# Patient Record
Sex: FEMALE | Race: WHITE | NOT HISPANIC OR LATINO
[De-identification: names, ages, dates, MRNs, and addresses within clinical notes are randomized per-mention and may not be internally consistent; named-entity substitution may affect disease eponyms.]

---

## 2019-02-10 ENCOUNTER — TRANSCRIPTION ENCOUNTER (OUTPATIENT)
Age: 59
End: 2019-02-10

## 2019-02-11 ENCOUNTER — INPATIENT (INPATIENT)
Facility: HOSPITAL | Age: 59
LOS: 0 days | Discharge: HOME | DRG: 343 | End: 2019-02-12
Attending: SURGERY | Admitting: SURGERY
Payer: COMMERCIAL

## 2019-02-11 ENCOUNTER — TRANSCRIPTION ENCOUNTER (OUTPATIENT)
Age: 59
End: 2019-02-11

## 2019-02-11 ENCOUNTER — RESULT REVIEW (OUTPATIENT)
Age: 59
End: 2019-02-11

## 2019-02-11 VITALS
HEIGHT: 62 IN | HEART RATE: 64 BPM | SYSTOLIC BLOOD PRESSURE: 135 MMHG | OXYGEN SATURATION: 100 % | WEIGHT: 139.99 LBS | TEMPERATURE: 98 F | DIASTOLIC BLOOD PRESSURE: 69 MMHG | RESPIRATION RATE: 18 BRPM

## 2019-02-11 DIAGNOSIS — K36 OTHER APPENDICITIS: ICD-10-CM

## 2019-02-11 DIAGNOSIS — Z98.891 HISTORY OF UTERINE SCAR FROM PREVIOUS SURGERY: Chronic | ICD-10-CM

## 2019-02-11 DIAGNOSIS — K35.80 UNSPECIFIED ACUTE APPENDICITIS: ICD-10-CM

## 2019-02-11 DIAGNOSIS — Z90.710 ACQUIRED ABSENCE OF BOTH CERVIX AND UTERUS: Chronic | ICD-10-CM

## 2019-02-11 LAB
ALBUMIN SERPL ELPH-MCNC: 3.9 G/DL — SIGNIFICANT CHANGE UP (ref 3.3–5)
ALP SERPL-CCNC: 50 U/L — SIGNIFICANT CHANGE UP (ref 40–120)
ALT FLD-CCNC: 35 U/L DA — SIGNIFICANT CHANGE UP (ref 10–45)
ANION GAP SERPL CALC-SCNC: 12 MMOL/L — SIGNIFICANT CHANGE UP (ref 5–17)
APTT BLD: 29.6 SEC — SIGNIFICANT CHANGE UP (ref 27.5–36.3)
AST SERPL-CCNC: 29 U/L — SIGNIFICANT CHANGE UP (ref 10–40)
BASOPHILS # BLD AUTO: 0.1 K/UL — SIGNIFICANT CHANGE UP (ref 0–0.2)
BASOPHILS NFR BLD AUTO: 0.5 % — SIGNIFICANT CHANGE UP (ref 0–2)
BILIRUB SERPL-MCNC: 0.6 MG/DL — SIGNIFICANT CHANGE UP (ref 0.2–1.2)
BLD GP AB SCN SERPL QL: SIGNIFICANT CHANGE UP
BUN SERPL-MCNC: 20 MG/DL — SIGNIFICANT CHANGE UP (ref 7–23)
CALCIUM SERPL-MCNC: 9.2 MG/DL — SIGNIFICANT CHANGE UP (ref 8.4–10.5)
CHLORIDE SERPL-SCNC: 106 MMOL/L — SIGNIFICANT CHANGE UP (ref 96–108)
CO2 SERPL-SCNC: 22 MMOL/L — SIGNIFICANT CHANGE UP (ref 22–31)
CREAT SERPL-MCNC: 0.84 MG/DL — SIGNIFICANT CHANGE UP (ref 0.5–1.3)
EOSINOPHIL # BLD AUTO: 0.1 K/UL — SIGNIFICANT CHANGE UP (ref 0–0.5)
EOSINOPHIL NFR BLD AUTO: 0.5 % — SIGNIFICANT CHANGE UP (ref 0–6)
GLUCOSE SERPL-MCNC: 108 MG/DL — HIGH (ref 70–99)
HCT VFR BLD CALC: 40.1 % — SIGNIFICANT CHANGE UP (ref 34.5–45)
HGB BLD-MCNC: 13.7 G/DL — SIGNIFICANT CHANGE UP (ref 11.5–15.5)
INR BLD: 0.97 RATIO — SIGNIFICANT CHANGE UP (ref 0.88–1.16)
LACTATE SERPL-SCNC: 2.1 MMOL/L — HIGH (ref 0.7–2)
LIDOCAIN IGE QN: 138 U/L — SIGNIFICANT CHANGE UP (ref 73–393)
LYMPHOCYTES # BLD AUTO: 1.8 K/UL — SIGNIFICANT CHANGE UP (ref 1–3.3)
LYMPHOCYTES # BLD AUTO: 15.2 % — SIGNIFICANT CHANGE UP (ref 13–44)
MCHC RBC-ENTMCNC: 30.3 PG — SIGNIFICANT CHANGE UP (ref 27–34)
MCHC RBC-ENTMCNC: 34.2 GM/DL — SIGNIFICANT CHANGE UP (ref 32–36)
MCV RBC AUTO: 88.6 FL — SIGNIFICANT CHANGE UP (ref 80–100)
MONOCYTES # BLD AUTO: 0.5 K/UL — SIGNIFICANT CHANGE UP (ref 0–0.9)
MONOCYTES NFR BLD AUTO: 4.5 % — SIGNIFICANT CHANGE UP (ref 1–9)
NEUTROPHILS # BLD AUTO: 9.2 K/UL — HIGH (ref 1.8–7.4)
NEUTROPHILS NFR BLD AUTO: 79.4 % — HIGH (ref 43–77)
PLATELET # BLD AUTO: 168 K/UL — SIGNIFICANT CHANGE UP (ref 150–400)
POTASSIUM SERPL-MCNC: 3.8 MMOL/L — SIGNIFICANT CHANGE UP (ref 3.5–5.3)
POTASSIUM SERPL-SCNC: 3.8 MMOL/L — SIGNIFICANT CHANGE UP (ref 3.5–5.3)
PROT SERPL-MCNC: 7.1 G/DL — SIGNIFICANT CHANGE UP (ref 6–8.3)
PROTHROM AB SERPL-ACNC: 10.8 SEC — SIGNIFICANT CHANGE UP (ref 10–12.9)
RBC # BLD: 4.53 M/UL — SIGNIFICANT CHANGE UP (ref 3.8–5.2)
RBC # FLD: 12.7 % — SIGNIFICANT CHANGE UP (ref 10.3–14.5)
SODIUM SERPL-SCNC: 140 MMOL/L — SIGNIFICANT CHANGE UP (ref 135–145)
TROPONIN I SERPL-MCNC: <.017 NG/ML — LOW (ref 0.02–0.06)
WBC # BLD: 11.6 K/UL — HIGH (ref 3.8–10.5)
WBC # FLD AUTO: 11.6 K/UL — HIGH (ref 3.8–10.5)

## 2019-02-11 PROCEDURE — 74177 CT ABD & PELVIS W/CONTRAST: CPT | Mod: 26

## 2019-02-11 PROCEDURE — 71045 X-RAY EXAM CHEST 1 VIEW: CPT | Mod: 26

## 2019-02-11 PROCEDURE — 99285 EMERGENCY DEPT VISIT HI MDM: CPT | Mod: 25

## 2019-02-11 PROCEDURE — 44970 LAPAROSCOPY APPENDECTOMY: CPT

## 2019-02-11 PROCEDURE — 88304 TISSUE EXAM BY PATHOLOGIST: CPT | Mod: 26

## 2019-02-11 RX ORDER — MORPHINE SULFATE 50 MG/1
4 CAPSULE, EXTENDED RELEASE ORAL ONCE
Qty: 0 | Refills: 0 | Status: DISCONTINUED | OUTPATIENT
Start: 2019-02-11 | End: 2019-02-11

## 2019-02-11 RX ORDER — OXYCODONE AND ACETAMINOPHEN 5; 325 MG/1; MG/1
1 TABLET ORAL EVERY 4 HOURS
Qty: 0 | Refills: 0 | Status: DISCONTINUED | OUTPATIENT
Start: 2019-02-11 | End: 2019-02-12

## 2019-02-11 RX ORDER — HYDROMORPHONE HYDROCHLORIDE 2 MG/ML
0.5 INJECTION INTRAMUSCULAR; INTRAVENOUS; SUBCUTANEOUS
Qty: 0 | Refills: 0 | Status: DISCONTINUED | OUTPATIENT
Start: 2019-02-11 | End: 2019-02-11

## 2019-02-11 RX ORDER — ONDANSETRON 8 MG/1
4 TABLET, FILM COATED ORAL ONCE
Qty: 0 | Refills: 0 | Status: DISCONTINUED | OUTPATIENT
Start: 2019-02-11 | End: 2019-02-11

## 2019-02-11 RX ORDER — SODIUM CHLORIDE 9 MG/ML
3 INJECTION INTRAMUSCULAR; INTRAVENOUS; SUBCUTANEOUS ONCE
Qty: 0 | Refills: 0 | Status: COMPLETED | OUTPATIENT
Start: 2019-02-11 | End: 2019-02-11

## 2019-02-11 RX ORDER — GALCANEZUMAB 100 MG/ML
0 INJECTION, SOLUTION SUBCUTANEOUS
Qty: 0 | Refills: 0 | COMMUNITY

## 2019-02-11 RX ORDER — SODIUM CHLORIDE 9 MG/ML
1000 INJECTION INTRAMUSCULAR; INTRAVENOUS; SUBCUTANEOUS ONCE
Qty: 0 | Refills: 0 | Status: COMPLETED | OUTPATIENT
Start: 2019-02-11 | End: 2019-02-11

## 2019-02-11 RX ORDER — MORPHINE SULFATE 50 MG/1
2 CAPSULE, EXTENDED RELEASE ORAL EVERY 4 HOURS
Qty: 0 | Refills: 0 | Status: DISCONTINUED | OUTPATIENT
Start: 2019-02-11 | End: 2019-02-12

## 2019-02-11 RX ORDER — ACETAMINOPHEN 500 MG
1000 TABLET ORAL ONCE
Qty: 0 | Refills: 0 | Status: COMPLETED | OUTPATIENT
Start: 2019-02-11 | End: 2019-02-11

## 2019-02-11 RX ORDER — DOCUSATE SODIUM 100 MG
100 CAPSULE ORAL THREE TIMES A DAY
Qty: 0 | Refills: 0 | Status: DISCONTINUED | OUTPATIENT
Start: 2019-02-11 | End: 2019-02-12

## 2019-02-11 RX ORDER — ONDANSETRON 8 MG/1
4 TABLET, FILM COATED ORAL ONCE
Qty: 0 | Refills: 0 | Status: COMPLETED | OUTPATIENT
Start: 2019-02-11 | End: 2019-02-11

## 2019-02-11 RX ORDER — PIPERACILLIN AND TAZOBACTAM 4; .5 G/20ML; G/20ML
3.38 INJECTION, POWDER, LYOPHILIZED, FOR SOLUTION INTRAVENOUS ONCE
Qty: 0 | Refills: 0 | Status: COMPLETED | OUTPATIENT
Start: 2019-02-11 | End: 2019-02-11

## 2019-02-11 RX ORDER — PIPERACILLIN AND TAZOBACTAM 4; .5 G/20ML; G/20ML
3.38 INJECTION, POWDER, LYOPHILIZED, FOR SOLUTION INTRAVENOUS EVERY 8 HOURS
Qty: 0 | Refills: 0 | Status: DISCONTINUED | OUTPATIENT
Start: 2019-02-11 | End: 2019-02-12

## 2019-02-11 RX ORDER — MORPHINE SULFATE 50 MG/1
2 CAPSULE, EXTENDED RELEASE ORAL ONCE
Qty: 0 | Refills: 0 | Status: DISCONTINUED | OUTPATIENT
Start: 2019-02-11 | End: 2019-02-11

## 2019-02-11 RX ORDER — ACETAMINOPHEN 500 MG
650 TABLET ORAL EVERY 6 HOURS
Qty: 0 | Refills: 0 | Status: DISCONTINUED | OUTPATIENT
Start: 2019-02-11 | End: 2019-02-12

## 2019-02-11 RX ORDER — SODIUM CHLORIDE 9 MG/ML
1000 INJECTION, SOLUTION INTRAVENOUS
Qty: 0 | Refills: 0 | Status: DISCONTINUED | OUTPATIENT
Start: 2019-02-11 | End: 2019-02-12

## 2019-02-11 RX ORDER — MORPHINE SULFATE 50 MG/1
1 CAPSULE, EXTENDED RELEASE ORAL ONCE
Qty: 0 | Refills: 0 | Status: DISCONTINUED | OUTPATIENT
Start: 2019-02-11 | End: 2019-02-11

## 2019-02-11 RX ORDER — SODIUM CHLORIDE 9 MG/ML
1000 INJECTION, SOLUTION INTRAVENOUS
Qty: 0 | Refills: 0 | Status: DISCONTINUED | OUTPATIENT
Start: 2019-02-11 | End: 2019-02-11

## 2019-02-11 RX ADMIN — MORPHINE SULFATE 2 MILLIGRAM(S): 50 CAPSULE, EXTENDED RELEASE ORAL at 19:41

## 2019-02-11 RX ADMIN — MORPHINE SULFATE 4 MILLIGRAM(S): 50 CAPSULE, EXTENDED RELEASE ORAL at 07:32

## 2019-02-11 RX ADMIN — SODIUM CHLORIDE 90 MILLILITER(S): 9 INJECTION, SOLUTION INTRAVENOUS at 22:03

## 2019-02-11 RX ADMIN — SODIUM CHLORIDE 1000 MILLILITER(S): 9 INJECTION INTRAMUSCULAR; INTRAVENOUS; SUBCUTANEOUS at 08:30

## 2019-02-11 RX ADMIN — ONDANSETRON 4 MILLIGRAM(S): 8 TABLET, FILM COATED ORAL at 07:30

## 2019-02-11 RX ADMIN — SODIUM CHLORIDE 1000 MILLILITER(S): 9 INJECTION INTRAMUSCULAR; INTRAVENOUS; SUBCUTANEOUS at 07:28

## 2019-02-11 RX ADMIN — HYDROMORPHONE HYDROCHLORIDE 0.5 MILLIGRAM(S): 2 INJECTION INTRAMUSCULAR; INTRAVENOUS; SUBCUTANEOUS at 17:14

## 2019-02-11 RX ADMIN — HYDROMORPHONE HYDROCHLORIDE 0.5 MILLIGRAM(S): 2 INJECTION INTRAMUSCULAR; INTRAVENOUS; SUBCUTANEOUS at 17:26

## 2019-02-11 RX ADMIN — MORPHINE SULFATE 4 MILLIGRAM(S): 50 CAPSULE, EXTENDED RELEASE ORAL at 13:10

## 2019-02-11 RX ADMIN — PIPERACILLIN AND TAZOBACTAM 25 GRAM(S): 4; .5 INJECTION, POWDER, LYOPHILIZED, FOR SOLUTION INTRAVENOUS at 19:10

## 2019-02-11 RX ADMIN — MORPHINE SULFATE 4 MILLIGRAM(S): 50 CAPSULE, EXTENDED RELEASE ORAL at 07:45

## 2019-02-11 RX ADMIN — Medication 400 MILLIGRAM(S): at 22:56

## 2019-02-11 RX ADMIN — MORPHINE SULFATE 4 MILLIGRAM(S): 50 CAPSULE, EXTENDED RELEASE ORAL at 12:56

## 2019-02-11 RX ADMIN — SODIUM CHLORIDE 3 MILLILITER(S): 9 INJECTION INTRAMUSCULAR; INTRAVENOUS; SUBCUTANEOUS at 07:26

## 2019-02-11 RX ADMIN — Medication 1000 MILLIGRAM(S): at 23:20

## 2019-02-11 RX ADMIN — PIPERACILLIN AND TAZOBACTAM 200 GRAM(S): 4; .5 INJECTION, POWDER, LYOPHILIZED, FOR SOLUTION INTRAVENOUS at 11:53

## 2019-02-11 RX ADMIN — MORPHINE SULFATE 2 MILLIGRAM(S): 50 CAPSULE, EXTENDED RELEASE ORAL at 10:03

## 2019-02-11 RX ADMIN — MORPHINE SULFATE 2 MILLIGRAM(S): 50 CAPSULE, EXTENDED RELEASE ORAL at 20:00

## 2019-02-11 RX ADMIN — MORPHINE SULFATE 2 MILLIGRAM(S): 50 CAPSULE, EXTENDED RELEASE ORAL at 10:15

## 2019-02-11 RX ADMIN — Medication 100 MILLIGRAM(S): at 21:59

## 2019-02-11 NOTE — DISCHARGE NOTE ADULT - NS AS ACTIVITY OBS
No Heavy lifting/straining/Do not make important decisions/Do not drive or operate machinery/Walking-Outdoors allowed/Bathing allowed/Showering allowed

## 2019-02-11 NOTE — H&P ADULT - ASSESSMENT
acute appendicitis  plan:  iv fluids  IV zosyn    OR booked at 1045 am for add on today as soon as room opens up

## 2019-02-11 NOTE — H&P ADULT - HISTORY OF PRESENT ILLNESS
57 yo female who had periumbilical pain yesterday now localized to right lower quadrant. associated with anorexia.  received morphine in ER earluier with some relief now pain has returned. past surgical history significant for hysterectomy.  no known allergies

## 2019-02-11 NOTE — ED PROVIDER NOTE - PHYSICAL EXAMINATION
General:     NAD, well-nourished, well-appearing  Head:     NC/AT, EOMI, oral mucosa moist  Neck:     trachea midline  Lungs:     CTA b/l, no w/r/r  CVS:     S1S2, RRR, no m/g/r  Abd:     +BS, s distended moderate , generalized tenderness, no organomegaly  Ext:    2+ radial and pedal pulses, no c/c/e  Neuro: AAOx3, no sensory/motor deficits

## 2019-02-11 NOTE — PROGRESS NOTE ADULT - PROBLEM SELECTOR PLAN 1
gave patient one dose of ofirmev. pain improved to 2/10. goal pain control 2-3/10 post operatively. avoid opioids if possible. patient now resting comfortably in bed.

## 2019-02-11 NOTE — DISCHARGE NOTE ADULT - HOSPITAL COURSE
pt presented to ER today and admitted for acute appendicits. Pt brought to OR urgently and underwent laparascopic appendectomy by dr gordon on 2/11/19.

## 2019-02-11 NOTE — ED ADULT NURSE NOTE - ED STAT RN HANDOFF DETAILS
Spoke to Rosa Maria in the OR, stated no assigned surgery time, Okay to bring patient to bed, will go from Surgical ICU to OR.

## 2019-02-11 NOTE — DISCHARGE NOTE ADULT - MEDICATION SUMMARY - MEDICATIONS TO TAKE
I will START or STAY ON the medications listed below when I get home from the hospital:    Emgality-gnlm 120 mg/mL subcutaneous solution  -- every month  -- Indication: For H    Percocet 5/325 oral tablet  -- 1 tab(s) by mouth every 8 hours, As Needed -for severe pain MDD:3   -- Caution federal law prohibits the transfer of this drug to any person other  than the person for whom it was prescribed.  May cause drowsiness.  Alcohol may intensify this effect.  Use care when operating dangerous machinery.  This prescription cannot be refilled.  This product contains acetaminophen.  Do not use  with any other product containing acetaminophen to prevent possible liver damage.  Using more of this medication than prescribed may cause serious breathing problems.    -- Indication: For post op pain I will START or STAY ON the medications listed below when I get home from the hospital:    Emgality-gnlm 120 mg/mL subcutaneous solution  -- every month  -- Indication: For Migraine    Percocet 5/325 oral tablet  -- 1 tab(s) by mouth every 8 hours, As Needed -for severe pain MDD:3   -- Caution federal law prohibits the transfer of this drug to any person other  than the person for whom it was prescribed.  May cause drowsiness.  Alcohol may intensify this effect.  Use care when operating dangerous machinery.  This prescription cannot be refilled.  This product contains acetaminophen.  Do not use  with any other product containing acetaminophen to prevent possible liver damage.  Using more of this medication than prescribed may cause serious breathing problems.    -- Indication: For post op pain    acetaminophen 325 mg oral tablet  -- 2 tab(s) by mouth every 6 hours, As needed, Temp greater or equal to 38.5C (101.3F), Mild Pain (1 - 3)  -- Indication: For pain

## 2019-02-11 NOTE — DISCHARGE NOTE ADULT - CARE PLAN
Principal Discharge DX:	Acute appendicitis  Goal:	improve quality  Assessment and plan of treatment:	s/p lap appendectomy

## 2019-02-11 NOTE — DISCHARGE NOTE ADULT - PATIENT PORTAL LINK FT
You can access the [facility] Patient Portal, offered by [facility], by registering with the following website: [email or web address]

## 2019-02-11 NOTE — PROGRESS NOTE ADULT - SUBJECTIVE AND OBJECTIVE BOX
Patient is a 58y old  Female who presents with a chief complaint of generalized abdominal pain (2019 16:28)      BRIEF HOSPITAL COURSE: 57 y/o female with no significant past medical hx who presented with 10/10 RLQ abdominal pain now s/p laparoscopic appendectomy (nonpeforated). Called to patients bedside to assess abdominal pain that has not improved after 2 mg morphine. Patient states pain has improved since the morphine but is still a 5/10 and disrupting sleep. Denies n/v/d. Hemodynamically stable.    Events last 24 hours: as above    PAST MEDICAL & SURGICAL HISTORY:  Age related osteoporosis, unspecified pathological fracture presence  Migraine  H/O abdominal hysterectomy  History of       Review of Systems:  CONSTITUTIONAL: No fever, chills, or fatigue  EYES: No eye pain, visual disturbances, or discharge  ENMT:  No difficulty hearing, tinnitus, vertigo; No sinus or throat pain  NECK: No pain or stiffness  RESPIRATORY: No cough, wheezing, chills or hemoptysis; No shortness of breath  CARDIOVASCULAR: No chest pain, palpitations, dizziness, or leg swelling  GASTROINTESTINAL: +abdominal and RLQ pain. No nausea, vomiting, or hematemesis; No diarrhea or constipation. No melena or hematochezia.  GENITOURINARY: No dysuria, frequency, hematuria, or incontinence  NEUROLOGICAL: No headaches, memory loss, loss of strength, numbness, or tremors  SKIN: No itching, burning, rashes, or lesions   MUSCULOSKELETAL: No joint pain or swelling; No muscle, back, or extremity pain  PSYCHIATRIC: No depression, anxiety, mood swings, or difficulty sleeping      Medications:  piperacillin/tazobactam IVPB. 3.375 Gram(s) IV Intermittent every 8 hours        acetaminophen   Tablet .. 650 milliGRAM(s) Oral every 6 hours PRN  morphine  - Injectable 2 milliGRAM(s) IV Push every 4 hours PRN  oxyCODONE    5 mG/acetaminophen 325 mG 1 Tablet(s) Oral every 4 hours PRN        docusate sodium 100 milliGRAM(s) Oral three times a day        lactated ringers. 1000 milliLiter(s) IV Continuous <Continuous>                ICU Vital Signs Last 24 Hrs  T(C): 36.3 (2019 21:04), Max: 36.6 (2019 06:53)  T(F): 97.4 (2019 21:04), Max: 97.9 (2019 06:53)  HR: 55 (2019 21:04) (48 - 65)  BP: 127/77 (2019 21:04) (107/66 - 144/69)  BP(mean): --  ABP: --  ABP(mean): --  RR: 15 (2019 21:04) (14 - 18)  SpO2: 97% (2019 21:04) (96% - 100%)          I&O's Detail    2019 07:01  -  2019 04:06  --------------------------------------------------------  IN:    lactated ringers.: 125 mL    Oral Fluid: 100 mL  Total IN: 225 mL    OUT:  Total OUT: 0 mL    Total NET: 225 mL            LABS:                        13.7   11.6  )-----------( 168      ( 2019 07:25 )             40.1     02-11    140  |  106  |  20  ----------------------------<  108<H>  3.8   |  22  |  0.84    Ca    9.2      2019 07:25    TPro  7.1  /  Alb  3.9  /  TBili  0.6  /  DBili  x   /  AST  29  /  ALT  35  /  AlkPhos  50  02-11      CARDIAC MARKERS ( 2019 07:25 )  <.017 ng/mL / x     / x     / x     / x          CAPILLARY BLOOD GLUCOSE        PT/INR - ( 2019 07:25 )   PT: 10.8 sec;   INR: 0.97 ratio         PTT - ( 2019 07:25 )  PTT:29.6 sec    CULTURES:      Physical Examination:    General: No acute distress.      HEENT: Pupils equal, reactive to light.  Symmetric.    PULM: Clear to auscultation bilaterally, no significant sputum production    NECK: Supple, no lymphadenopathy, trachea midline    CVS: Regular rate and rhythm, no murmurs, rubs, or gallops    ABD: TTP in RLQ over surgical sites. Soft, nondistended, normoactive bowel sounds, no masses    EXT: No edema, nontender    SKIN: Warm and well perfused, no rashes noted.    NEURO: Alert, oriented, interactive, nonfocal    DEVICES: none    RADIOLOGY:   EXAM:  CT ABDOMEN AND PELVIS OC IC      PROCEDURE DATE:  2019        INTERPRETATION:  CT abdomen and pelvis with contrast  Comparison 16  History abdominal pain    Contrast Omnipaque 90 cc; 10 cc discarded    There are small low-attenuation lesions in the liver demonstrating   lobular peripheral enhancement most typical of hemangiomas. The largest   measures 2 cm in long axis dimension. The spleen and gallbladder,   pancreas and adrenals and kidneys are within normal limits.     Theappendix is intermediate in caliber at 8 mm in overall dimension   with mild luminal dilatation measuring 5 mm. This is increased since the   prior exam. There is no appendicolith. There is trace surrounding fat   stranding associated with the neck of the appendix without abscess, free   air or obstruction. The appendix is partially retrocecal.     There is no ascites or adenopathy or focal inflammation of the   peritoneal fat. The urinary bladder, uterus and adnexa are within normal   limits.    IMPRESSION:  Findings most suspicious for early acute appendicitis                  MONICA ROWLAND M.D., ATTENDING RADIOLOGIST  This document has been electronically signed. 2019  9:57AM

## 2019-02-11 NOTE — H&P ADULT - ATTENDING COMMENTS
I explained procedure to patient,  and daughter incuding pros and cons and possible conversion to open.  I answered all their questions.

## 2019-02-11 NOTE — H&P ADULT - NSHPLABSRESULTS_GEN_ALL_CORE
Complete Blood Count + Automated Diff (02.11.19 @ 07:25)    WBC Count: 11.6 K/uL    RBC Count: 4.53 M/uL    Hemoglobin: 13.7 g/dL    Hematocrit: 40.1 %    Mean Cell Volume: 88.6 fl    Mean Cell Hemoglobin: 30.3 pg    Mean Cell Hemoglobin Conc: 34.2 gm/dL    Red Cell Distrib Width: 12.7 %    Platelet Count - Automated: 168 K/uL    Auto Neutrophil #: 9.2 K/uL    Auto Lymphocyte #: 1.8 K/uL    Auto Monocyte #: 0.5 K/uL    Auto Eosinophil #: 0.1 K/uL    Auto Basophil #: 0.1 K/uL    Auto Neutrophil %: 79.4: Differential percentages must be correlated with absolute numbers for  clinical significance. %    Auto Lymphocyte %: 15.2 %    Auto Monocyte %: 4.5 %    Auto Eosinophil %: 0.5 %    Auto Basophil %: 0.5 %      Comprehensive Metabolic Panel (02.11.19 @ 07:25)    Sodium, Serum: 140 mmol/L    Potassium, Serum: 3.8 mmol/L    Chloride, Serum: 106 mmol/L    Carbon Dioxide, Serum: 22 mmol/L    Anion Gap, Serum: 12 mmol/L    Blood Urea Nitrogen, Serum: 20 mg/dL    Creatinine, Serum: 0.84 mg/dL    Glucose, Serum: 108 mg/dL    Calcium, Total Serum: 9.2 mg/dL    Protein Total, Serum: 7.1 g/dL    Albumin, Serum: 3.9 g/dL    Bilirubin Total, Serum: 0.6 mg/dL    Alkaline Phosphatase, Serum: 50 U/L    Aspartate Aminotransferase (AST/SGOT): 29 U/L    Alanine Aminotransferase (ALT/SGPT): 35 U/L DA            ct scan(I personally reviewed images)  appendicitis-no evidence of perforation

## 2019-02-11 NOTE — BRIEF OPERATIVE NOTE - PROCEDURE
<<-----Click on this checkbox to enter Procedure Appendectomy, laparoscopic  02/11/2019    Active  [id number]

## 2019-02-11 NOTE — ED PROVIDER NOTE - CHPI ED SYMPTOMS NEG
no blood in stool/no diarrhea/no dysuria/no hematuria/no vomiting/no fever/no chills/no burning urination

## 2019-02-11 NOTE — ED PROVIDER NOTE - NS ED ROS FT
Constitutional: (-) fever  (-)chills  (-)sweats  Eyes/ENT: (-) blurry vision, (-) epistaxis  (-)rhinorrhea   (-) sore throat    Cardiovascular: (-) chest pain, (-) palpitations (-) edema   Respiratory: (-) cough, (-) shortness of breath   Gastrointestinal: (+)nausea  (-)vomiting, (-) diarrhea  (+) abdominal pain   :  (-)dysuria, (-)frequency, (-)urgency, (-)hematuria  Musculoskeletal: (-) neck pain, (-) back pain, (-) joint pain  Integumentary: (-) rash, (-) edema  Neurological: (-) headache, (-) altered mental status  (-)LOC

## 2019-02-11 NOTE — H&P ADULT - NSHPPHYSICALEXAM_GEN_ALL_CORE
in ER bay  vital stable, mild discomfort  Heent-sclera anicteric    abdomen-lower right paramedian scar(s/p hysterectomy), non distended  3-4+/4+ rlq tenderness over McNurney's point    extrem-neg

## 2019-02-11 NOTE — DISCHARGE NOTE ADULT - CARE PROVIDER_API CALL
Lele Pacheco)  Surgery  [address] [place] [address]  Phone: [phone]  Fax: [phone]  Follow Up Time:

## 2019-02-12 VITALS
OXYGEN SATURATION: 100 % | RESPIRATION RATE: 15 BRPM | HEART RATE: 56 BPM | TEMPERATURE: 98 F | SYSTOLIC BLOOD PRESSURE: 106 MMHG | DIASTOLIC BLOOD PRESSURE: 68 MMHG | WEIGHT: 154.1 LBS

## 2019-02-12 DIAGNOSIS — K35.80 UNSPECIFIED ACUTE APPENDICITIS: ICD-10-CM

## 2019-02-12 LAB
ANION GAP SERPL CALC-SCNC: 6 MMOL/L — SIGNIFICANT CHANGE UP (ref 5–17)
BASOPHILS # BLD AUTO: 0 K/UL — SIGNIFICANT CHANGE UP (ref 0–0.2)
BASOPHILS NFR BLD AUTO: 0.4 % — SIGNIFICANT CHANGE UP (ref 0–2)
BUN SERPL-MCNC: 10 MG/DL — SIGNIFICANT CHANGE UP (ref 7–23)
CALCIUM SERPL-MCNC: 8.4 MG/DL — SIGNIFICANT CHANGE UP (ref 8.4–10.5)
CHLORIDE SERPL-SCNC: 108 MMOL/L — SIGNIFICANT CHANGE UP (ref 96–108)
CO2 SERPL-SCNC: 26 MMOL/L — SIGNIFICANT CHANGE UP (ref 22–31)
CREAT SERPL-MCNC: 0.87 MG/DL — SIGNIFICANT CHANGE UP (ref 0.5–1.3)
EOSINOPHIL # BLD AUTO: 0 K/UL — SIGNIFICANT CHANGE UP (ref 0–0.5)
EOSINOPHIL NFR BLD AUTO: 0.1 % — SIGNIFICANT CHANGE UP (ref 0–6)
GLUCOSE SERPL-MCNC: 105 MG/DL — HIGH (ref 70–99)
HCT VFR BLD CALC: 37.2 % — SIGNIFICANT CHANGE UP (ref 34.5–45)
HGB BLD-MCNC: 12.3 G/DL — SIGNIFICANT CHANGE UP (ref 11.5–15.5)
LYMPHOCYTES # BLD AUTO: 1.3 K/UL — SIGNIFICANT CHANGE UP (ref 1–3.3)
LYMPHOCYTES # BLD AUTO: 12.9 % — LOW (ref 13–44)
MCHC RBC-ENTMCNC: 29.8 PG — SIGNIFICANT CHANGE UP (ref 27–34)
MCHC RBC-ENTMCNC: 33.1 GM/DL — SIGNIFICANT CHANGE UP (ref 32–36)
MCV RBC AUTO: 90.1 FL — SIGNIFICANT CHANGE UP (ref 80–100)
MONOCYTES # BLD AUTO: 0.7 K/UL — SIGNIFICANT CHANGE UP (ref 0–0.9)
MONOCYTES NFR BLD AUTO: 6.6 % — SIGNIFICANT CHANGE UP (ref 1–9)
NEUTROPHILS # BLD AUTO: 8 K/UL — HIGH (ref 1.8–7.4)
NEUTROPHILS NFR BLD AUTO: 80 % — HIGH (ref 43–77)
PLATELET # BLD AUTO: 165 K/UL — SIGNIFICANT CHANGE UP (ref 150–400)
POTASSIUM SERPL-MCNC: 4.5 MMOL/L — SIGNIFICANT CHANGE UP (ref 3.5–5.3)
POTASSIUM SERPL-SCNC: 4.5 MMOL/L — SIGNIFICANT CHANGE UP (ref 3.5–5.3)
RBC # BLD: 4.13 M/UL — SIGNIFICANT CHANGE UP (ref 3.8–5.2)
RBC # FLD: 13.2 % — SIGNIFICANT CHANGE UP (ref 10.3–14.5)
SODIUM SERPL-SCNC: 140 MMOL/L — SIGNIFICANT CHANGE UP (ref 135–145)
WBC # BLD: 10 K/UL — SIGNIFICANT CHANGE UP (ref 3.8–10.5)
WBC # FLD AUTO: 10 K/UL — SIGNIFICANT CHANGE UP (ref 3.8–10.5)

## 2019-02-12 PROCEDURE — 71045 X-RAY EXAM CHEST 1 VIEW: CPT

## 2019-02-12 PROCEDURE — 85027 COMPLETE CBC AUTOMATED: CPT

## 2019-02-12 PROCEDURE — 80048 BASIC METABOLIC PNL TOTAL CA: CPT

## 2019-02-12 PROCEDURE — 96376 TX/PRO/DX INJ SAME DRUG ADON: CPT

## 2019-02-12 PROCEDURE — 86850 RBC ANTIBODY SCREEN: CPT

## 2019-02-12 PROCEDURE — 88304 TISSUE EXAM BY PATHOLOGIST: CPT

## 2019-02-12 PROCEDURE — 84484 ASSAY OF TROPONIN QUANT: CPT

## 2019-02-12 PROCEDURE — 85730 THROMBOPLASTIN TIME PARTIAL: CPT

## 2019-02-12 PROCEDURE — 80053 COMPREHEN METABOLIC PANEL: CPT

## 2019-02-12 PROCEDURE — 74177 CT ABD & PELVIS W/CONTRAST: CPT

## 2019-02-12 PROCEDURE — 96375 TX/PRO/DX INJ NEW DRUG ADDON: CPT

## 2019-02-12 PROCEDURE — 96374 THER/PROPH/DIAG INJ IV PUSH: CPT | Mod: XU

## 2019-02-12 PROCEDURE — 86803 HEPATITIS C AB TEST: CPT

## 2019-02-12 PROCEDURE — 36415 COLL VENOUS BLD VENIPUNCTURE: CPT

## 2019-02-12 PROCEDURE — 83690 ASSAY OF LIPASE: CPT

## 2019-02-12 PROCEDURE — 99285 EMERGENCY DEPT VISIT HI MDM: CPT | Mod: 25

## 2019-02-12 PROCEDURE — 85610 PROTHROMBIN TIME: CPT

## 2019-02-12 PROCEDURE — 86900 BLOOD TYPING SEROLOGIC ABO: CPT

## 2019-02-12 PROCEDURE — 83605 ASSAY OF LACTIC ACID: CPT

## 2019-02-12 PROCEDURE — 86901 BLOOD TYPING SEROLOGIC RH(D): CPT

## 2019-02-12 RX ORDER — ACETAMINOPHEN 500 MG
2 TABLET ORAL
Qty: 0 | Refills: 0 | COMMUNITY
Start: 2019-02-12

## 2019-02-12 RX ADMIN — Medication 100 MILLIGRAM(S): at 05:43

## 2019-02-12 RX ADMIN — Medication 650 MILLIGRAM(S): at 03:43

## 2019-02-12 RX ADMIN — PIPERACILLIN AND TAZOBACTAM 25 GRAM(S): 4; .5 INJECTION, POWDER, LYOPHILIZED, FOR SOLUTION INTRAVENOUS at 02:47

## 2019-02-12 RX ADMIN — Medication 650 MILLIGRAM(S): at 02:43

## 2019-02-12 NOTE — PROGRESS NOTE ADULT - ASSESSMENT
a/p    doing well  reasured about shoulder paint(C02 will resorb)    d/c with pain meds  f/u next week

## 2019-02-12 NOTE — PROGRESS NOTE ADULT - SUBJECTIVE AND OBJECTIVE BOX
pod#1    a and o x 3  c/o right shoulder pain    vitals-stable    abdomen-soft and non distended and non tender  no trocar site hernias      labs:Complete Blood Count + Automated Diff (02.12.19 @ 07:50)    WBC Count: 10.0 K/uL    RBC Count: 4.13 M/uL    Hemoglobin: 12.3 g/dL    Hematocrit: 37.2 %    Mean Cell Volume: 90.1 fl    Mean Cell Hemoglobin: 29.8 pg    Mean Cell Hemoglobin Conc: 33.1 gm/dL    Red Cell Distrib Width: 13.2 %    Platelet Count - Automated: 165 K/uL    Auto Neutrophil #: 8.0 K/uL    Auto Lymphocyte #: 1.3 K/uL    Auto Monocyte #: 0.7 K/uL    Auto Eosinophil #: 0.0 K/uL    Auto Basophil #: 0.0 K/uL    Auto Neutrophil %: 80.0: Differential percentages must be correlated with absolute numbers for  clinical significance. %    Auto Lymphocyte %: 12.9 %    Auto Monocyte %: 6.6 %    Auto Eosinophil %: 0.1 %    Auto Basophil %: 0.4 %

## 2019-02-13 PROBLEM — G43.909 MIGRAINE, UNSPECIFIED, NOT INTRACTABLE, WITHOUT STATUS MIGRAINOSUS: Chronic | Status: ACTIVE | Noted: 2019-02-11

## 2019-02-13 PROBLEM — M81.0 AGE-RELATED OSTEOPOROSIS WITHOUT CURRENT PATHOLOGICAL FRACTURE: Chronic | Status: ACTIVE | Noted: 2019-02-11

## 2019-02-13 NOTE — CHART NOTE - NSCHARTNOTEFT_GEN_A_CORE
addendum note in response to medical record query:    please note patient was discharged 2/12/2019 not 2/11/2019.

## 2019-02-14 ENCOUNTER — APPOINTMENT (OUTPATIENT)
Dept: OTOLARYNGOLOGY | Facility: CLINIC | Age: 59
End: 2019-02-14
Payer: COMMERCIAL

## 2019-02-14 VITALS
OXYGEN SATURATION: 98 % | BODY MASS INDEX: 26.5 KG/M2 | RESPIRATION RATE: 16 BRPM | WEIGHT: 144 LBS | HEIGHT: 62 IN | SYSTOLIC BLOOD PRESSURE: 122 MMHG | DIASTOLIC BLOOD PRESSURE: 70 MMHG | HEART RATE: 64 BPM

## 2019-02-14 DIAGNOSIS — K13.0 DISEASES OF LIPS: ICD-10-CM

## 2019-02-14 LAB — SURGICAL PATHOLOGY STUDY: SIGNIFICANT CHANGE UP

## 2019-02-14 PROCEDURE — 99243 OFF/OP CNSLTJ NEW/EST LOW 30: CPT

## 2019-02-15 PROBLEM — K13.0 LIP ULCER: Status: ACTIVE | Noted: 2019-02-15

## 2019-02-15 NOTE — ASSESSMENT
[FreeTextEntry1] : Ulcer on right upper lip is c/w a pressure ulcer from the endotracheal tube.  No intervention is required at this time.  Pt will f/u if ulcer does not heal within the next 10 days.

## 2019-02-15 NOTE — CONSULT LETTER
[Dear  ___] : Dear  [unfilled], [Consult Letter:] : I had the pleasure of evaluating your patient, [unfilled]. [Please see my note below.] : Please see my note below. [Consult Closing:] : Thank you very much for allowing me to participate in the care of this patient.  If you have any questions, please do not hesitate to contact me. [Sincerely,] : Sincerely, [FreeTextEntry2] : Lele Pacheco MD [FreeTextEntry3] : Chandan George MD, FACS\par Clinical \par Dept. of Otolaryngology\par [facility]\par  [DrAlissa  ___] : Dr. DOOLEY

## 2019-02-15 NOTE — HISTORY OF PRESENT ILLNESS
[de-identified] : 57 y/o F with a h/o emergency appendectomy 3 days ago.  Over the past day she developed a painful sore on the right upper lip mucosa.

## 2019-02-15 NOTE — REASON FOR VISIT
[Initial Consultation] : an initial consultation for [FreeTextEntry2] : R upper lip sore after emergency APPY.

## 2019-02-15 NOTE — PHYSICAL EXAM
[Midline] : trachea located in midline position [de-identified] : Pressure ulcer on right upper lip.  [Normal] : no rashes

## 2019-02-18 ENCOUNTER — TRANSCRIPTION ENCOUNTER (OUTPATIENT)
Age: 59
End: 2019-02-18

## 2019-02-26 ENCOUNTER — APPOINTMENT (OUTPATIENT)
Dept: SURGERY | Facility: CLINIC | Age: 59
End: 2019-02-26
Payer: COMMERCIAL

## 2019-02-26 VITALS
HEIGHT: 62 IN | HEART RATE: 62 BPM | OXYGEN SATURATION: 97 % | DIASTOLIC BLOOD PRESSURE: 70 MMHG | SYSTOLIC BLOOD PRESSURE: 108 MMHG | RESPIRATION RATE: 16 BRPM | BODY MASS INDEX: 26.5 KG/M2 | WEIGHT: 144 LBS | TEMPERATURE: 98.3 F

## 2019-02-26 DIAGNOSIS — K35.890 OTHER ACUTE APPENDICITIS WITHOUT PERFORATION OR GANGRENE: ICD-10-CM

## 2019-02-26 PROCEDURE — 99024 POSTOP FOLLOW-UP VISIT: CPT

## 2019-05-08 ENCOUNTER — APPOINTMENT (OUTPATIENT)
Dept: OBGYN | Facility: CLINIC | Age: 59
End: 2019-05-08
Payer: COMMERCIAL

## 2019-05-08 VITALS
BODY MASS INDEX: 26.5 KG/M2 | WEIGHT: 144 LBS | HEIGHT: 62 IN | DIASTOLIC BLOOD PRESSURE: 72 MMHG | HEART RATE: 78 BPM | OXYGEN SATURATION: 98 % | SYSTOLIC BLOOD PRESSURE: 116 MMHG

## 2019-05-08 DIAGNOSIS — R92.2 INCONCLUSIVE MAMMOGRAM: ICD-10-CM

## 2019-05-08 DIAGNOSIS — R10.2 PELVIC AND PERINEAL PAIN: ICD-10-CM

## 2019-05-08 DIAGNOSIS — Z12.31 ENCOUNTER FOR SCREENING MAMMOGRAM FOR MALIGNANT NEOPLASM OF BREAST: ICD-10-CM

## 2019-05-08 DIAGNOSIS — R14.0 ABDOMINAL DISTENSION (GASEOUS): ICD-10-CM

## 2019-05-08 PROCEDURE — 99203 OFFICE O/P NEW LOW 30 MIN: CPT

## 2019-05-08 RX ORDER — GALCANEZUMAB 120 MG/ML
120 INJECTION, SOLUTION SUBCUTANEOUS
Qty: 1 | Refills: 0 | Status: ACTIVE | COMMUNITY
Start: 2019-04-04

## 2019-05-08 RX ORDER — DENOSUMAB 60 MG/ML
60 INJECTION SUBCUTANEOUS
Qty: 1 | Refills: 0 | Status: ACTIVE | COMMUNITY
Start: 2019-05-06

## 2019-05-08 NOTE — HISTORY OF PRESENT ILLNESS
[Good] : being in good health [Last Mammogram ___] : Last Mammogram was [unfilled] [Last Bone Density ___] : Last bone density studies [unfilled] [Last Colonoscopy ___] : Last colonoscopy [unfilled] [Last Pap ___] : Last cervical pap smear was [unfilled] [Postmenopausal] : is postmenopausal

## 2019-05-08 NOTE — PHYSICAL EXAM
[Awake] : awake [Alert] : alert [Acute Distress] : no acute distress [Mass] : no breast mass [Nipple Discharge] : no nipple discharge [Soft] : soft [Axillary LAD] : no axillary lymphadenopathy [Tender] : non tender [Oriented x3] : oriented to person, place, and time [Normal] : cervix [No Bleeding] : there was no active vaginal bleeding [Absent] : absent [Adnexa Absent] : was absent on the right [Uterine Adnexae] : was normal on the left

## 2019-05-08 NOTE — REVIEW OF SYSTEMS
[Feeling Tired] : feeling tired [Abdominal Pain] : abdominal pain [Anxiety] : anxiety [Nl] : Endocrine

## 2019-05-08 NOTE — CHIEF COMPLAINT
[Initial Visit] : initial GYN visit [FreeTextEntry1] : JASON,RSO age 41 for fibroids and bleeding\par pap 2017,mammo 2017 colon 5 yrs ago dexa 2019\par c/o bloating and abd pain--continuous --nothing specific aggravates/alleviates

## 2019-05-09 LAB — HPV HIGH+LOW RISK DNA PNL CVX: NOT DETECTED

## 2019-05-12 LAB — CYTOLOGY CVX/VAG DOC THIN PREP: NORMAL

## 2019-05-21 ENCOUNTER — FORM ENCOUNTER (OUTPATIENT)
Age: 59
End: 2019-05-21

## 2019-05-22 ENCOUNTER — APPOINTMENT (OUTPATIENT)
Dept: ULTRASOUND IMAGING | Facility: CLINIC | Age: 59
End: 2019-05-22
Payer: COMMERCIAL

## 2019-05-22 ENCOUNTER — APPOINTMENT (OUTPATIENT)
Dept: MAMMOGRAPHY | Facility: CLINIC | Age: 59
End: 2019-05-22
Payer: COMMERCIAL

## 2019-05-22 ENCOUNTER — OUTPATIENT (OUTPATIENT)
Dept: OUTPATIENT SERVICES | Facility: HOSPITAL | Age: 59
LOS: 1 days | End: 2019-05-22
Payer: COMMERCIAL

## 2019-05-22 DIAGNOSIS — Z98.891 HISTORY OF UTERINE SCAR FROM PREVIOUS SURGERY: Chronic | ICD-10-CM

## 2019-05-22 DIAGNOSIS — Z12.31 ENCOUNTER FOR SCREENING MAMMOGRAM FOR MALIGNANT NEOPLASM OF BREAST: ICD-10-CM

## 2019-05-22 DIAGNOSIS — R92.2 INCONCLUSIVE MAMMOGRAM: ICD-10-CM

## 2019-05-22 DIAGNOSIS — Z90.710 ACQUIRED ABSENCE OF BOTH CERVIX AND UTERUS: Chronic | ICD-10-CM

## 2019-05-22 PROCEDURE — 76830 TRANSVAGINAL US NON-OB: CPT | Mod: 26

## 2019-05-22 PROCEDURE — 76641 ULTRASOUND BREAST COMPLETE: CPT

## 2019-05-22 PROCEDURE — 77063 BREAST TOMOSYNTHESIS BI: CPT | Mod: 26

## 2019-05-22 PROCEDURE — 76830 TRANSVAGINAL US NON-OB: CPT

## 2019-05-22 PROCEDURE — 76641 ULTRASOUND BREAST COMPLETE: CPT | Mod: 26,50

## 2019-05-22 PROCEDURE — 77067 SCR MAMMO BI INCL CAD: CPT | Mod: 26

## 2019-05-22 PROCEDURE — 77067 SCR MAMMO BI INCL CAD: CPT

## 2019-05-22 PROCEDURE — 76857 US EXAM PELVIC LIMITED: CPT | Mod: 26,59

## 2019-05-22 PROCEDURE — 76856 US EXAM PELVIC COMPLETE: CPT

## 2019-05-22 PROCEDURE — 77063 BREAST TOMOSYNTHESIS BI: CPT

## 2019-05-24 ENCOUNTER — OTHER (OUTPATIENT)
Age: 59
End: 2019-05-24

## 2020-05-04 NOTE — ED ADULT NURSE NOTE - NSSISCREENINGQ4_ED_A_ED
Patients daughter returned call to cancel 05/082020 appointment. Per daughter patient was at [facility] for stroke 4/11/2020 and has medication changes.  Per daughter patient needs therapy for swallowing, has memory loss. Patient returns to apartment 05/05/2020.   Unable to have video visit because daughter can not visit patient due to Covid restrictions at apartment. Please advise   No

## 2020-08-03 ENCOUNTER — APPOINTMENT (OUTPATIENT)
Dept: CT IMAGING | Facility: CLINIC | Age: 60
End: 2020-08-03
Payer: COMMERCIAL

## 2020-08-03 ENCOUNTER — OUTPATIENT (OUTPATIENT)
Dept: OUTPATIENT SERVICES | Facility: HOSPITAL | Age: 60
LOS: 1 days | End: 2020-08-03
Payer: COMMERCIAL

## 2020-08-03 DIAGNOSIS — Z90.710 ACQUIRED ABSENCE OF BOTH CERVIX AND UTERUS: Chronic | ICD-10-CM

## 2020-08-03 DIAGNOSIS — Z98.891 HISTORY OF UTERINE SCAR FROM PREVIOUS SURGERY: Chronic | ICD-10-CM

## 2020-08-03 DIAGNOSIS — Z00.8 ENCOUNTER FOR OTHER GENERAL EXAMINATION: ICD-10-CM

## 2020-08-03 PROCEDURE — 74176 CT ABD & PELVIS W/O CONTRAST: CPT | Mod: 26

## 2020-08-03 PROCEDURE — 74176 CT ABD & PELVIS W/O CONTRAST: CPT

## 2020-12-14 ENCOUNTER — TRANSCRIPTION ENCOUNTER (OUTPATIENT)
Age: 60
End: 2020-12-14

## 2021-02-16 ENCOUNTER — TRANSCRIPTION ENCOUNTER (OUTPATIENT)
Age: 61
End: 2021-02-16

## 2021-03-23 ENCOUNTER — OUTPATIENT (OUTPATIENT)
Dept: OUTPATIENT SERVICES | Facility: HOSPITAL | Age: 61
LOS: 1 days | End: 2021-03-23
Payer: COMMERCIAL

## 2021-03-23 ENCOUNTER — APPOINTMENT (OUTPATIENT)
Dept: MAMMOGRAPHY | Facility: CLINIC | Age: 61
End: 2021-03-23
Payer: COMMERCIAL

## 2021-03-23 ENCOUNTER — APPOINTMENT (OUTPATIENT)
Dept: ULTRASOUND IMAGING | Facility: CLINIC | Age: 61
End: 2021-03-23
Payer: COMMERCIAL

## 2021-03-23 DIAGNOSIS — Z98.891 HISTORY OF UTERINE SCAR FROM PREVIOUS SURGERY: Chronic | ICD-10-CM

## 2021-03-23 DIAGNOSIS — Z90.710 ACQUIRED ABSENCE OF BOTH CERVIX AND UTERUS: Chronic | ICD-10-CM

## 2021-03-23 DIAGNOSIS — Z00.8 ENCOUNTER FOR OTHER GENERAL EXAMINATION: ICD-10-CM

## 2021-03-23 PROCEDURE — 77067 SCR MAMMO BI INCL CAD: CPT | Mod: 26

## 2021-03-23 PROCEDURE — 77063 BREAST TOMOSYNTHESIS BI: CPT | Mod: 26

## 2021-03-23 PROCEDURE — 76641 ULTRASOUND BREAST COMPLETE: CPT | Mod: 26,50

## 2021-03-23 PROCEDURE — 76641 ULTRASOUND BREAST COMPLETE: CPT

## 2021-03-23 PROCEDURE — 77067 SCR MAMMO BI INCL CAD: CPT

## 2021-03-23 PROCEDURE — 77063 BREAST TOMOSYNTHESIS BI: CPT

## 2021-10-13 ENCOUNTER — APPOINTMENT (OUTPATIENT)
Dept: OTOLARYNGOLOGY | Facility: CLINIC | Age: 61
End: 2021-10-13
Payer: COMMERCIAL

## 2021-10-13 ENCOUNTER — TRANSCRIPTION ENCOUNTER (OUTPATIENT)
Age: 61
End: 2021-10-13

## 2021-10-13 ENCOUNTER — APPOINTMENT (OUTPATIENT)
Dept: OTOLARYNGOLOGY | Facility: CLINIC | Age: 61
End: 2021-10-13

## 2021-10-13 VITALS
HEART RATE: 59 BPM | SYSTOLIC BLOOD PRESSURE: 117 MMHG | HEIGHT: 62 IN | BODY MASS INDEX: 26.68 KG/M2 | DIASTOLIC BLOOD PRESSURE: 80 MMHG | WEIGHT: 145 LBS

## 2021-10-13 VITALS
SYSTOLIC BLOOD PRESSURE: 110 MMHG | TEMPERATURE: 98.3 F | DIASTOLIC BLOOD PRESSURE: 76 MMHG | HEART RATE: 57 BPM | OXYGEN SATURATION: 99 % | WEIGHT: 145 LBS | BODY MASS INDEX: 26.68 KG/M2 | HEIGHT: 62 IN

## 2021-10-13 PROCEDURE — 92567 TYMPANOMETRY: CPT

## 2021-10-13 PROCEDURE — 99214 OFFICE O/P EST MOD 30 MIN: CPT | Mod: 25

## 2021-10-13 PROCEDURE — 92557 COMPREHENSIVE HEARING TEST: CPT

## 2021-10-13 RX ORDER — PREDNISONE 10 MG/1
10 TABLET ORAL
Qty: 73 | Refills: 0 | Status: ACTIVE | COMMUNITY
Start: 2021-10-13 | End: 1900-01-01

## 2021-10-13 RX ORDER — COLD-HOT PACK
EACH MISCELLANEOUS
Refills: 0 | Status: ACTIVE | COMMUNITY

## 2021-10-13 RX ORDER — ONABOTULINUMTOXINA 100 [USP'U]/1
INJECTION, POWDER, LYOPHILIZED, FOR SOLUTION INTRADERMAL; INTRAMUSCULAR
Refills: 0 | Status: ACTIVE | COMMUNITY

## 2021-10-13 RX ORDER — SUMATRIPTAN 100 MG/1
100 TABLET, FILM COATED ORAL
Qty: 9 | Refills: 0 | Status: COMPLETED | COMMUNITY
Start: 2018-12-04 | End: 2021-10-13

## 2021-10-13 RX ORDER — HYDROCODONE BITARTRATE AND HOMATROPINE METHYLBROMIDE 5; 1.5 MG/5ML; MG/5ML
5-1.5 SYRUP ORAL
Qty: 120 | Refills: 0 | Status: COMPLETED | COMMUNITY
Start: 2019-01-08 | End: 2021-10-13

## 2021-10-13 RX ORDER — PROPRANOLOL HYDROCHLORIDE 10 MG/1
10 TABLET ORAL
Qty: 30 | Refills: 0 | Status: COMPLETED | COMMUNITY
Start: 2019-01-16 | End: 2021-10-13

## 2021-10-13 RX ORDER — AZITHROMYCIN 250 MG/1
250 TABLET, FILM COATED ORAL
Qty: 6 | Refills: 0 | Status: COMPLETED | COMMUNITY
Start: 2019-01-08 | End: 2021-10-13

## 2021-10-13 RX ORDER — TOPIRAMATE 50 MG/1
50 CAPSULE, EXTENDED RELEASE ORAL
Qty: 30 | Refills: 0 | Status: COMPLETED | COMMUNITY
Start: 2019-01-15 | End: 2021-10-13

## 2021-10-13 NOTE — REASON FOR VISIT
[Initial Consultation] : an initial consultation for [FreeTextEntry2] : referred by Dr. George for for left ear noises.

## 2021-10-13 NOTE — HISTORY OF PRESENT ILLNESS
[de-identified] : 61 year old female referred by Dr. George for left ear noises.\par States sudden left loud tinnitus 10/11/2021 that woke her up with ear pressure. Reports varies in intesity and hearing decreased when exacerbated. \par Reports left otalgia when she presses on her ear. \par Denies head/otologic trauma. Reports had dental work 10/06/2021, cavity was filled.\par Denies new medication.\par Denies otorrhea, ear infections. \par Denies prior CT scan or MRI. \par States completed COVID vaccine (moderna) 03/2021.\par Reports has history of migraines, prolia injections every 6 months, botox every 4 months, emgality monthly.

## 2021-10-20 ENCOUNTER — APPOINTMENT (OUTPATIENT)
Dept: OTOLARYNGOLOGY | Facility: CLINIC | Age: 61
End: 2021-10-20
Payer: COMMERCIAL

## 2021-10-20 VITALS
HEIGHT: 62 IN | SYSTOLIC BLOOD PRESSURE: 112 MMHG | WEIGHT: 145 LBS | BODY MASS INDEX: 26.68 KG/M2 | HEART RATE: 59 BPM | DIASTOLIC BLOOD PRESSURE: 70 MMHG

## 2021-10-20 PROCEDURE — 99213 OFFICE O/P EST LOW 20 MIN: CPT

## 2021-10-20 PROCEDURE — 92557 COMPREHENSIVE HEARING TEST: CPT

## 2021-10-20 PROCEDURE — 92567 TYMPANOMETRY: CPT

## 2021-10-20 NOTE — HISTORY OF PRESENT ILLNESS
[de-identified] : 60 y/o female presents for f/u\par States that sounds in ears have decreased\par Denies otalgia or otorrhea\par Patient on prednisone taper, MRI scheduled for tomorrow

## 2021-10-20 NOTE — REASON FOR VISIT
[Subsequent Evaluation] : a subsequent evaluation for [FreeTextEntry2] : Patient here to follow up on LT ear noises

## 2021-10-20 NOTE — DATA REVIEWED
[de-identified] : - type A tymp AD; CNS AS\par - AD: hearing -750 Hz sloping to a mild SNHL 5596-2813 Hz rising to WNL 4797-6659 Hz\par -AS: moderate SNHL at 250Hz rising to -8000 Hz 250-8000 Hz\par 1)ENT F/U 2) Re-eval in conjunction w/ medical management

## 2021-10-21 ENCOUNTER — APPOINTMENT (OUTPATIENT)
Dept: MRI IMAGING | Facility: CLINIC | Age: 61
End: 2021-10-21
Payer: COMMERCIAL

## 2021-10-21 ENCOUNTER — OUTPATIENT (OUTPATIENT)
Dept: OUTPATIENT SERVICES | Facility: HOSPITAL | Age: 61
LOS: 1 days | End: 2021-10-21
Payer: COMMERCIAL

## 2021-10-21 DIAGNOSIS — H93.292 OTHER ABNORMAL AUDITORY PERCEPTIONS, LEFT EAR: ICD-10-CM

## 2021-10-21 DIAGNOSIS — H93.12 TINNITUS, LEFT EAR: ICD-10-CM

## 2021-10-21 DIAGNOSIS — Z98.891 HISTORY OF UTERINE SCAR FROM PREVIOUS SURGERY: Chronic | ICD-10-CM

## 2021-10-21 DIAGNOSIS — Z90.710 ACQUIRED ABSENCE OF BOTH CERVIX AND UTERUS: Chronic | ICD-10-CM

## 2021-10-21 PROCEDURE — 70553 MRI BRAIN STEM W/O & W/DYE: CPT

## 2021-10-21 PROCEDURE — A9585: CPT

## 2021-10-21 PROCEDURE — 70553 MRI BRAIN STEM W/O & W/DYE: CPT | Mod: 26

## 2021-10-27 ENCOUNTER — APPOINTMENT (OUTPATIENT)
Dept: OTOLARYNGOLOGY | Facility: CLINIC | Age: 61
End: 2021-10-27
Payer: COMMERCIAL

## 2021-10-27 VITALS
BODY MASS INDEX: 26.68 KG/M2 | WEIGHT: 145 LBS | DIASTOLIC BLOOD PRESSURE: 76 MMHG | HEIGHT: 62 IN | SYSTOLIC BLOOD PRESSURE: 121 MMHG | HEART RATE: 66 BPM

## 2021-10-27 PROCEDURE — 92557 COMPREHENSIVE HEARING TEST: CPT

## 2021-10-27 PROCEDURE — 92567 TYMPANOMETRY: CPT

## 2021-10-27 PROCEDURE — 99214 OFFICE O/P EST MOD 30 MIN: CPT

## 2021-10-27 RX ORDER — PREDNISONE 10 MG/1
10 TABLET ORAL
Qty: 20 | Refills: 0 | Status: ACTIVE | COMMUNITY
Start: 2021-10-27 | End: 1900-01-01

## 2021-10-27 NOTE — DATA REVIEWED
[de-identified] : - type A tymps au\par right: hearing ess wnl sloping to a mild hf loss with a mild snhl dip noted at 1khz\par left: mild snhl rising to wnl sloping back to mild at 8khz\par *slight decrease noted n left ear 500-2000hz re: 10/20/21 audio\par rec: 1) ent f/u 2) re-eval as per md

## 2021-10-27 NOTE — REASON FOR VISIT
[Subsequent Evaluation] : a subsequent evaluation for [FreeTextEntry2] : Patient is being seen for a subsequent evaluation for LT ear noises

## 2021-11-10 ENCOUNTER — APPOINTMENT (OUTPATIENT)
Dept: OTOLARYNGOLOGY | Facility: CLINIC | Age: 61
End: 2021-11-10
Payer: COMMERCIAL

## 2021-11-10 VITALS
DIASTOLIC BLOOD PRESSURE: 70 MMHG | HEIGHT: 62 IN | WEIGHT: 145 LBS | SYSTOLIC BLOOD PRESSURE: 103 MMHG | BODY MASS INDEX: 26.68 KG/M2 | HEART RATE: 71 BPM

## 2021-11-10 DIAGNOSIS — H91.22 SUDDEN IDIOPATHIC HEARING LOSS, LEFT EAR: ICD-10-CM

## 2021-11-10 DIAGNOSIS — H93.292 OTHER ABNORMAL AUDITORY PERCEPTIONS, LEFT EAR: ICD-10-CM

## 2021-11-10 DIAGNOSIS — H93.293 OTHER ABNORMAL AUDITORY PERCEPTIONS, BILATERAL: ICD-10-CM

## 2021-11-10 DIAGNOSIS — H93.12 TINNITUS, LEFT EAR: ICD-10-CM

## 2021-11-10 PROCEDURE — 92557 COMPREHENSIVE HEARING TEST: CPT

## 2021-11-10 PROCEDURE — 99213 OFFICE O/P EST LOW 20 MIN: CPT

## 2021-11-10 PROCEDURE — 92567 TYMPANOMETRY: CPT

## 2021-11-10 NOTE — HISTORY OF PRESENT ILLNESS
[de-identified] : 61 year old female presents for follow up for LT ear noises.  States intermittent left ear tinnitus changes loudness, "sounds like a high pitch siren" can cause ear to vibrate when it becomes really loud, tries to push ear with her fingers to fix the discomfort. Denies otalgia,otorrhea, recent fevers, ear infections, dizziness or vertigo.

## 2022-04-24 ENCOUNTER — TRANSCRIPTION ENCOUNTER (OUTPATIENT)
Age: 62
End: 2022-04-24

## 2022-06-17 ENCOUNTER — NON-APPOINTMENT (OUTPATIENT)
Age: 62
End: 2022-06-17

## 2022-10-17 ENCOUNTER — NON-APPOINTMENT (OUTPATIENT)
Age: 62
End: 2022-10-17

## 2022-10-20 ENCOUNTER — APPOINTMENT (OUTPATIENT)
Dept: OBGYN | Facility: CLINIC | Age: 62
End: 2022-10-20

## 2022-10-20 VITALS
HEIGHT: 62 IN | DIASTOLIC BLOOD PRESSURE: 72 MMHG | SYSTOLIC BLOOD PRESSURE: 110 MMHG | BODY MASS INDEX: 27.97 KG/M2 | WEIGHT: 152 LBS

## 2022-10-20 DIAGNOSIS — G43.909 MIGRAINE, UNSPECIFIED, NOT INTRACTABLE, W/OUT STATUS MIGRAINOSUS: ICD-10-CM

## 2022-10-20 DIAGNOSIS — O09.00 SUPERVISION OF PREGNANCY WITH HISTORY OF INFERTILITY, UNSPECIFIED TRIMESTER: ICD-10-CM

## 2022-10-20 DIAGNOSIS — H81.02 MENIERE'S DISEASE, LEFT EAR: ICD-10-CM

## 2022-10-20 DIAGNOSIS — Z01.419 ENCOUNTER FOR GYNECOLOGICAL EXAMINATION (GENERAL) (ROUTINE) W/OUT ABNORMAL FINDINGS: ICD-10-CM

## 2022-10-20 PROCEDURE — 99386 PREV VISIT NEW AGE 40-64: CPT

## 2022-10-21 NOTE — HISTORY OF PRESENT ILLNESS
[FreeTextEntry1] : 62-year-old female G 3 para 2102\par s/P supracervical hysterectomy for fibroids\par RSO as well\par History of conceiving with Clomid\par Patient not on HRT\par No vaginal bleeding\par 3 abnormal Pap smears

## 2022-10-21 NOTE — DISCUSSION/SUMMARY
[FreeTextEntry1] : 62-year-old  102 status post cervical supracervical hysterectomy for fibroids at age 41\par RSO as well\par Pap smear with cotesting \par Screening mammogram\par Screening colonoscopy discussed, up-to-date\par Follow-up 1 year\par \par \par

## 2022-10-23 LAB — HPV HIGH+LOW RISK DNA PNL CVX: NOT DETECTED

## 2022-10-30 LAB — CYTOLOGY CVX/VAG DOC THIN PREP: ABNORMAL

## 2022-12-26 ENCOUNTER — APPOINTMENT (OUTPATIENT)
Dept: ULTRASOUND IMAGING | Facility: CLINIC | Age: 62
End: 2022-12-26
Payer: COMMERCIAL

## 2022-12-26 ENCOUNTER — RESULT REVIEW (OUTPATIENT)
Age: 62
End: 2022-12-26

## 2022-12-26 ENCOUNTER — OUTPATIENT (OUTPATIENT)
Dept: OUTPATIENT SERVICES | Facility: HOSPITAL | Age: 62
LOS: 1 days | End: 2022-12-26
Payer: COMMERCIAL

## 2022-12-26 ENCOUNTER — APPOINTMENT (OUTPATIENT)
Dept: MAMMOGRAPHY | Facility: CLINIC | Age: 62
End: 2022-12-26
Payer: COMMERCIAL

## 2022-12-26 DIAGNOSIS — Z90.710 ACQUIRED ABSENCE OF BOTH CERVIX AND UTERUS: Chronic | ICD-10-CM

## 2022-12-26 DIAGNOSIS — Z00.8 ENCOUNTER FOR OTHER GENERAL EXAMINATION: ICD-10-CM

## 2022-12-26 DIAGNOSIS — Z01.419 ENCOUNTER FOR GYNECOLOGICAL EXAMINATION (GENERAL) (ROUTINE) WITHOUT ABNORMAL FINDINGS: ICD-10-CM

## 2022-12-26 DIAGNOSIS — Z98.891 HISTORY OF UTERINE SCAR FROM PREVIOUS SURGERY: Chronic | ICD-10-CM

## 2022-12-26 PROCEDURE — 77063 BREAST TOMOSYNTHESIS BI: CPT | Mod: 26

## 2022-12-26 PROCEDURE — 77063 BREAST TOMOSYNTHESIS BI: CPT

## 2022-12-26 PROCEDURE — 77067 SCR MAMMO BI INCL CAD: CPT | Mod: 26

## 2022-12-26 PROCEDURE — 76641 ULTRASOUND BREAST COMPLETE: CPT

## 2022-12-26 PROCEDURE — 77067 SCR MAMMO BI INCL CAD: CPT

## 2022-12-26 PROCEDURE — 76641 ULTRASOUND BREAST COMPLETE: CPT | Mod: 26,50

## 2024-04-18 RX ORDER — HYDROCHLOROTHIAZIDE 25 MG/1
25 TABLET ORAL
Qty: 90 | Refills: 0 | Status: ACTIVE | COMMUNITY
Start: 2021-10-27 | End: 1900-01-01

## 2024-07-26 ENCOUNTER — APPOINTMENT (OUTPATIENT)
Dept: OBGYN | Facility: CLINIC | Age: 64
End: 2024-07-26

## 2024-07-26 VITALS — SYSTOLIC BLOOD PRESSURE: 121 MMHG | WEIGHT: 150.8 LBS | BODY MASS INDEX: 27.58 KG/M2 | DIASTOLIC BLOOD PRESSURE: 78 MMHG

## 2024-07-26 DIAGNOSIS — N63.10 UNSPECIFIED LUMP IN THE RIGHT BREAST, UNSPECIFIED QUADRANT: ICD-10-CM

## 2024-07-26 DIAGNOSIS — Z01.419 ENCOUNTER FOR GYNECOLOGICAL EXAMINATION (GENERAL) (ROUTINE) W/OUT ABNORMAL FINDINGS: ICD-10-CM

## 2024-07-26 DIAGNOSIS — N63.0 UNSPECIFIED LUMP IN UNSPECIFIED BREAST: ICD-10-CM

## 2024-07-26 PROCEDURE — G0444 DEPRESSION SCREEN ANNUAL: CPT | Mod: 59

## 2024-07-26 PROCEDURE — 99396 PREV VISIT EST AGE 40-64: CPT

## 2024-07-26 NOTE — HISTORY OF PRESENT ILLNESS
[FreeTextEntry1] : 63-year-old postmenopausal woman here for annual exam No complaints no vaginal bleeding Up-to-date with colonoscopy Due for breast imaging Pap in 2022 was negative HPV negative

## 2024-07-26 NOTE — DISCUSSION/SUMMARY
[FreeTextEntry1] : 63-year-old postmenopausal woman here for annual exam + palpable breast mass right breast Diagnostic mammogram and breast us  Up-to-date with colonoscopy Pap in 2022 was negative HPV negative f/u in 3 weeks PHQ-9 Patient screened for depression - no signs of clinical depression. PHQ-9 scores reviewed over the course of the visit 5-10minutes of fac to face time. Follow up with changes in mood including other symptoms of anxiety. score 0

## 2024-07-26 NOTE — PHYSICAL EXAM
[FreeTextEntry6] : 1cm right breast mass 3 o'clock, 3 cm from areola closer and lateral to the sternum  [Diffuse Fibrous Tissue In The Right Breast] : fibrocystic changes [___cm] : a ~M [unfilled] ~Ucm inferior medial quadrant mass was palpated [Labia Majora] : normal [Labia Minora] : normal [Normal] : normal [Uterine Adnexae] : normal

## 2024-07-30 ENCOUNTER — RESULT REVIEW (OUTPATIENT)
Age: 64
End: 2024-07-30

## 2024-07-30 ENCOUNTER — OUTPATIENT (OUTPATIENT)
Dept: OUTPATIENT SERVICES | Facility: HOSPITAL | Age: 64
LOS: 1 days | End: 2024-07-30
Payer: COMMERCIAL

## 2024-07-30 ENCOUNTER — APPOINTMENT (OUTPATIENT)
Dept: MAMMOGRAPHY | Facility: CLINIC | Age: 64
End: 2024-07-30
Payer: COMMERCIAL

## 2024-07-30 ENCOUNTER — APPOINTMENT (OUTPATIENT)
Dept: ULTRASOUND IMAGING | Facility: CLINIC | Age: 64
End: 2024-07-30

## 2024-07-30 DIAGNOSIS — Z90.710 ACQUIRED ABSENCE OF BOTH CERVIX AND UTERUS: Chronic | ICD-10-CM

## 2024-07-30 DIAGNOSIS — N63.10 UNSPECIFIED LUMP IN THE RIGHT BREAST, UNSPECIFIED QUADRANT: ICD-10-CM

## 2024-07-30 DIAGNOSIS — Z00.8 ENCOUNTER FOR OTHER GENERAL EXAMINATION: ICD-10-CM

## 2024-07-30 DIAGNOSIS — Z98.891 HISTORY OF UTERINE SCAR FROM PREVIOUS SURGERY: Chronic | ICD-10-CM

## 2024-07-30 PROCEDURE — 77066 DX MAMMO INCL CAD BI: CPT

## 2024-07-30 PROCEDURE — G0279: CPT

## 2024-07-30 PROCEDURE — 76641 ULTRASOUND BREAST COMPLETE: CPT

## 2024-07-30 PROCEDURE — 76641 ULTRASOUND BREAST COMPLETE: CPT | Mod: 26,50

## 2024-07-30 PROCEDURE — G0279: CPT | Mod: 26

## 2024-07-30 PROCEDURE — 77066 DX MAMMO INCL CAD BI: CPT | Mod: 26

## 2025-08-15 ENCOUNTER — RESULT REVIEW (OUTPATIENT)
Age: 65
End: 2025-08-15

## 2025-08-15 ENCOUNTER — APPOINTMENT (OUTPATIENT)
Dept: MAMMOGRAPHY | Facility: CLINIC | Age: 65
End: 2025-08-15
Payer: COMMERCIAL

## 2025-08-15 ENCOUNTER — APPOINTMENT (OUTPATIENT)
Dept: ULTRASOUND IMAGING | Facility: CLINIC | Age: 65
End: 2025-08-15
Payer: COMMERCIAL

## 2025-08-15 ENCOUNTER — OUTPATIENT (OUTPATIENT)
Dept: OUTPATIENT SERVICES | Facility: HOSPITAL | Age: 65
LOS: 1 days | End: 2025-08-15
Payer: COMMERCIAL

## 2025-08-15 DIAGNOSIS — Z00.00 ENCOUNTER FOR GENERAL ADULT MEDICAL EXAMINATION WITHOUT ABNORMAL FINDINGS: ICD-10-CM

## 2025-08-15 DIAGNOSIS — Z98.891 HISTORY OF UTERINE SCAR FROM PREVIOUS SURGERY: Chronic | ICD-10-CM

## 2025-08-15 DIAGNOSIS — Z90.710 ACQUIRED ABSENCE OF BOTH CERVIX AND UTERUS: Chronic | ICD-10-CM

## 2025-08-15 DIAGNOSIS — Z12.39 ENCOUNTER FOR OTHER SCREENING FOR MALIGNANT NEOPLASM OF BREAST: ICD-10-CM

## 2025-08-15 DIAGNOSIS — Z00.8 ENCOUNTER FOR OTHER GENERAL EXAMINATION: ICD-10-CM

## 2025-08-15 PROCEDURE — 77063 BREAST TOMOSYNTHESIS BI: CPT | Mod: 26

## 2025-08-15 PROCEDURE — 77067 SCR MAMMO BI INCL CAD: CPT

## 2025-08-15 PROCEDURE — 77067 SCR MAMMO BI INCL CAD: CPT | Mod: 26

## 2025-08-15 PROCEDURE — 77063 BREAST TOMOSYNTHESIS BI: CPT

## 2025-08-15 PROCEDURE — 76641 ULTRASOUND BREAST COMPLETE: CPT

## 2025-08-15 PROCEDURE — 76641 ULTRASOUND BREAST COMPLETE: CPT | Mod: 26,50
